# Patient Record
Sex: FEMALE | Race: WHITE | Employment: UNEMPLOYED | URBAN - METROPOLITAN AREA
[De-identification: names, ages, dates, MRNs, and addresses within clinical notes are randomized per-mention and may not be internally consistent; named-entity substitution may affect disease eponyms.]

---

## 2017-03-27 PROBLEM — R74.01 ELEVATED TRANSAMINASE LEVEL: Status: ACTIVE | Noted: 2017-03-27

## 2017-03-27 PROBLEM — K52.9 COLITIS: Status: ACTIVE | Noted: 2017-03-27

## 2017-03-27 PROBLEM — Z98.890 HISTORY OF TOE SURGERY: Status: ACTIVE | Noted: 2017-03-27

## 2018-08-19 ENCOUNTER — HOSPITAL ENCOUNTER (EMERGENCY)
Age: 53
Discharge: HOME | End: 2018-08-19
Payer: COMMERCIAL

## 2018-08-19 ENCOUNTER — APPOINTMENT (OUTPATIENT)
Dept: GENERAL RADIOLOGY | Age: 53
End: 2018-08-19
Payer: COMMERCIAL

## 2018-08-19 VITALS
SYSTOLIC BLOOD PRESSURE: 128 MMHG | DIASTOLIC BLOOD PRESSURE: 76 MMHG | OXYGEN SATURATION: 97 % | BODY MASS INDEX: 23.54 KG/M2 | HEART RATE: 68 BPM | TEMPERATURE: 97.8 F | WEIGHT: 150 LBS | HEIGHT: 67 IN | RESPIRATION RATE: 16 BRPM

## 2018-08-19 DIAGNOSIS — S92.514A CLOSED NONDISPLACED FRACTURE OF PROXIMAL PHALANX OF LESSER TOE OF RIGHT FOOT, INITIAL ENCOUNTER: Primary | ICD-10-CM

## 2018-08-19 PROCEDURE — 73630 X-RAY EXAM OF FOOT: CPT

## 2018-08-19 PROCEDURE — 99283 EMERGENCY DEPT VISIT LOW MDM: CPT

## 2018-08-19 RX ORDER — NAPROXEN 500 MG/1
500 TABLET ORAL 2 TIMES DAILY
Qty: 14 TABLET | Refills: 0 | Status: SHIPPED | OUTPATIENT
Start: 2018-08-19 | End: 2019-05-10 | Stop reason: ALTCHOICE

## 2018-08-19 ASSESSMENT — PAIN SCALES - GENERAL: PAINLEVEL_OUTOF10: 7

## 2018-08-19 ASSESSMENT — PAIN DESCRIPTION - PAIN TYPE: TYPE: ACUTE PAIN

## 2018-08-19 ASSESSMENT — PAIN DESCRIPTION - DESCRIPTORS: DESCRIPTORS: ACHING

## 2018-08-19 ASSESSMENT — PAIN DESCRIPTION - LOCATION: LOCATION: TOE (COMMENT WHICH ONE)

## 2018-08-19 ASSESSMENT — PAIN DESCRIPTION - ORIENTATION: ORIENTATION: RIGHT

## 2018-08-19 NOTE — ED PROVIDER NOTES
[facility]       Department of Emergency Medicine   ED  Provider Note  Admit Date/RoomTime: 2018  2:13 PM  ED Room: /  Chief Complaint   Foot Pain (stubbed 4th toe per patient cannot bear weight at all)    History of Present Illness   Source of history provided by:  patient. History/Exam Limitations: none. Montez Layton is a 45 y.o. old female presenting to the emergency department by private vehicle, for right, 4th toe pain which occured 1 day(s) prior to arrival. Cause of complaint: the patient accidentally kicked an end table while at home. Previous injury: no.  Since onset the symptoms have been moderate in degree. Her pain is aggraveated by movement, use and palpation and relieved by nothing. No other injury    ROS    Pertinent positives and negatives are stated within HPI, all other systems reviewed and are negative. Past Surgical History:   Procedure Laterality Date     SECTION      CHOLECYSTECTOMY, LAPAROSCOPIC      CRANIOTOMY  2011    left frontal crani for resection of arachnoid cyst    CYST REMOVAL      ECHO COMPL W DOP COLOR FLOW  2011         ECHO COMPL W DOP COLOR FLOW  2011         FOOT SURGERY      multiple times    HEMORRHOID SURGERY     Social History:  reports that she quit smoking about 26 years ago. She started smoking about 40 years ago. She smoked 0.50 packs per day. She has never used smokeless tobacco. She reports that she does not drink alcohol or use drugs. Family History: family history includes Cancer in her father and son. Allergies: Patient has no known allergies. Physical Exam     Vitals:    18 1416   BP: 128/76   Pulse: 68   Resp: 16   Temp: 97.8 °F (36.6 °C)   SpO2: 97%           Oxygen Saturation Interpretation: Normal.    Constitutional:  Alert, development consistent with age. Neck:  Normal ROM. Supple. Toe(s):   right, 4th toe. Tenderness: moderate. Swelling: Moderate. Deformity: no.              ROM: diminished range with pain. Skin:  Ecchymosis noted. Neurovascular: Motor deficit: none. Sensory deficit: none. Pulse deficit: none. Capillary refill: normal.  Foot:             Tenderness:  mild. Swelling: None. Deformity: no.            ROM: full range of motion. Skin:  no erythema, rash or wounds noted. Lymphatics: No lymphangitis or adenopathy noted. Neurological:  Oriented. Motor functions intact. .    Lab / Imaging Results   (All laboratory and radiology results have been personally reviewed by myself)  Labs:  No results found for this visit on 08/19/18. Imaging: All Radiology results interpreted by Radiologist unless otherwise noted. XR FOOT RIGHT (MIN 3 VIEWS)   Final Result      1. Oblique fracture proximal phalanx of the fourth digit. 2. Age-indeterminate lucency lateral aspect of the proximal phalanx of   the second digit at the level of PIP joint. Please correlate with   point tenderness. ED Course / Medical Decision Making   Medications - No data to display       Consult(s):   None    Procedure(s):   none    MDM:    Films were obtained and are positive for fracture. Plan is subsequently for symptom control, limited weight-bearing and  immobilization with appropriate outpatient follow-up. Counseling: The emergency provider has spoken with the patient and discussed todays results, in addition to providing specific details for the plan of care and counseling regarding the diagnosis and prognosis. Questions are answered at this time and they are agreeable with the plan. Assessment      1.  Closed nondisplaced fracture of proximal phalanx of lesser toe of right foot, initial encounter      Plan   Discharge to home  Patient condition is good    New Medications     New Prescriptions    NAPROXEN (NAPROSYN) 500 MG TABLET    Take 1 tablet by mouth 2 times

## 2019-02-05 LAB
CHOLESTEROL, TOTAL: 208 MG/DL
CHOLESTEROL/HDL RATIO: 3
HDLC SERPL-MCNC: 70 MG/DL (ref 35–70)
LDL CHOLESTEROL CALCULATED: 114 MG/DL (ref 0–160)
TRIGL SERPL-MCNC: 127 MG/DL
VLDLC SERPL CALC-MCNC: NORMAL MG/DL

## 2019-04-06 VITALS
HEIGHT: 67 IN | DIASTOLIC BLOOD PRESSURE: 70 MMHG | SYSTOLIC BLOOD PRESSURE: 120 MMHG | BODY MASS INDEX: 24.48 KG/M2 | TEMPERATURE: 98.2 F | WEIGHT: 156 LBS | HEART RATE: 94 BPM

## 2019-05-10 ENCOUNTER — OFFICE VISIT (OUTPATIENT)
Dept: PRIMARY CARE CLINIC | Age: 54
End: 2019-05-10
Payer: COMMERCIAL

## 2019-05-10 VITALS
TEMPERATURE: 97.5 F | WEIGHT: 156 LBS | OXYGEN SATURATION: 99 % | HEIGHT: 67 IN | BODY MASS INDEX: 24.48 KG/M2 | HEART RATE: 61 BPM | DIASTOLIC BLOOD PRESSURE: 82 MMHG | SYSTOLIC BLOOD PRESSURE: 124 MMHG

## 2019-05-10 DIAGNOSIS — D70.9 NEUTROPENIA, UNSPECIFIED TYPE (HCC): ICD-10-CM

## 2019-05-10 DIAGNOSIS — G93.0 INTRACRANIAL ARACHNOID CYST: ICD-10-CM

## 2019-05-10 DIAGNOSIS — F34.1 DYSTHYMIA: Primary | ICD-10-CM

## 2019-05-10 PROCEDURE — 99214 OFFICE O/P EST MOD 30 MIN: CPT | Performed by: FAMILY MEDICINE

## 2019-05-10 RX ORDER — DULOXETIN HYDROCHLORIDE 30 MG/1
30 CAPSULE, DELAYED RELEASE ORAL DAILY
Qty: 30 CAPSULE | Refills: 3 | Status: SHIPPED | OUTPATIENT
Start: 2019-05-10

## 2019-05-10 ASSESSMENT — PATIENT HEALTH QUESTIONNAIRE - PHQ9
SUM OF ALL RESPONSES TO PHQ9 QUESTIONS 1 & 2: 4
6. FEELING BAD ABOUT YOURSELF - OR THAT YOU ARE A FAILURE OR HAVE LET YOURSELF OR YOUR FAMILY DOWN: 0
7. TROUBLE CONCENTRATING ON THINGS, SUCH AS READING THE NEWSPAPER OR WATCHING TELEVISION: 0
SUM OF ALL RESPONSES TO PHQ QUESTIONS 1-9: 8
4. FEELING TIRED OR HAVING LITTLE ENERGY: 2
8. MOVING OR SPEAKING SO SLOWLY THAT OTHER PEOPLE COULD HAVE NOTICED. OR THE OPPOSITE, BEING SO FIGETY OR RESTLESS THAT YOU HAVE BEEN MOVING AROUND A LOT MORE THAN USUAL: 0
3. TROUBLE FALLING OR STAYING ASLEEP: 0
9. THOUGHTS THAT YOU WOULD BE BETTER OFF DEAD, OR OF HURTING YOURSELF: 0
1. LITTLE INTEREST OR PLEASURE IN DOING THINGS: 2
10. IF YOU CHECKED OFF ANY PROBLEMS, HOW DIFFICULT HAVE THESE PROBLEMS MADE IT FOR YOU TO DO YOUR WORK, TAKE CARE OF THINGS AT HOME, OR GET ALONG WITH OTHER PEOPLE: 1
5. POOR APPETITE OR OVEREATING: 2
2. FEELING DOWN, DEPRESSED OR HOPELESS: 2
SUM OF ALL RESPONSES TO PHQ QUESTIONS 1-9: 8

## 2019-05-10 ASSESSMENT — ENCOUNTER SYMPTOMS
GASTROINTESTINAL NEGATIVE: 1
ALLERGIC/IMMUNOLOGIC NEGATIVE: 1
RESPIRATORY NEGATIVE: 1
EYES NEGATIVE: 1

## 2019-05-10 NOTE — PROGRESS NOTES
5/10/19     Anupam Barfield    : 1965 Sex: female   Age: 52 y.o. Chief Complaint   Patient presents with    Discuss Labs    Depression     Would like to start re-starting medication. Prior to Admission medications    Medication Sig Start Date End Date Taking? Authorizing Provider   DULoxetine (CYMBALTA) 30 MG extended release capsule Take 1 capsule by mouth daily 5/10/19  Yes Barbara Gilmore,    DULoxetine (CYMBALTA) 30 MG capsule Take 30 mg by mouth every evening     Historical Provider, MD        HPI: Aundrea Curry presents today in medical follow-up issues of mild depression dysthymia. Previously on Cymbalta did well. Recurring symptoms. Initiated Cymbalta 30 mg daily today. History of intracranial arachnoid cyst has been stable. Recent labs mild neutropenia but stable at 3.6        ROS:  Review of Systems   Constitutional: Negative. HENT: Negative. Eyes: Negative. Respiratory: Negative. Gastrointestinal: Negative. Endocrine: Negative. Genitourinary: Negative. Musculoskeletal: Negative. Skin: Negative. Allergic/Immunologic: Negative. Neurological: Negative. Hematological: Negative. Psychiatric/Behavioral: Negative. Mild recurrent symptoms of depression.  Resume Cymbalta          Current Outpatient Medications:     DULoxetine (CYMBALTA) 30 MG extended release capsule, Take 1 capsule by mouth daily, Disp: 30 capsule, Rfl: 3    DULoxetine (CYMBALTA) 30 MG capsule, Take 30 mg by mouth every evening , Disp: , Rfl:     No Known Allergies    Social History     Tobacco Use    Smoking status: Former Smoker     Packs/day: 0.50     Years: 8.00     Pack years: 4.00     Start date: 3/27/1978     Last attempt to quit: 1991     Years since quittin.6    Smokeless tobacco: Never Used   Substance Use Topics    Alcohol use: No    Drug use: No      Past Surgical History:   Procedure Laterality Date     SECTION      CHOLECYSTECTOMY, LAPAROSCOPIC      CRANIOTOMY  9/19/2011    left frontal crani for resection of arachnoid cyst    CYST REMOVAL      ECHO COMPL W DOP COLOR FLOW  9/23/2011         ECHO COMPL W DOP COLOR FLOW  9/25/2011         FOOT SURGERY      multiple times    HEMORRHOID SURGERY       Family History   Problem Relation Age of Onset    Cancer Father     Cancer Son     Cancer Mother     Diabetes Mother      Past Medical History:   Diagnosis Date    Arachnoid cyst 9/20/2011    Brain mass 9/17/2011    Depression     H/o SVT (supraventricular tachycardia) (HCC)     Hypothyroidism     Osteoarthritis        Vitals:    05/10/19 0953   BP: 124/82   Pulse: 61   Temp: 97.5 °F (36.4 °C)   TempSrc: Tympanic   SpO2: 99%   Weight: 156 lb (70.8 kg)   Height: 5' 7\" (1.702 m)       Physical Exam   Constitutional: She is oriented to person, place, and time. She appears well-developed and well-nourished. HENT:   Head: Normocephalic. Right Ear: External ear normal.   Left Ear: External ear normal.   Nose: Nose normal.   Mouth/Throat: Oropharynx is clear and moist.   Eyes: Pupils are equal, round, and reactive to light. Conjunctivae and EOM are normal.   Neck: Normal range of motion. Neck supple. Cardiovascular: Normal rate and intact distal pulses. Pulmonary/Chest: Breath sounds normal.   Abdominal: Soft. Bowel sounds are normal.   Musculoskeletal: Normal range of motion. Neurological: She is alert and oriented to person, place, and time. Skin: Skin is warm and dry. Psychiatric: She has a normal mood and affect. Her behavior is normal.   Nursing note and vitals reviewed. As noted patient doing well and didn't resume Cymbalta. Labs reviewed with her today stable white count was 3.6 with neutrophils at 1430 stable cholesterol 218 HDL 61 and  chemistry is all stable TSH 0.79 T4 1 0.0 and stable A1c at 5.1     repeat labs on follow-up.  She will see me in 3 months         Plan Per Assessment:  Seth Hay was seen today for discuss labs and depression. Diagnoses and all orders for this visit:    Dysthymia  -     CBC Auto Differential; Future  -     Comprehensive Metabolic Panel; Future  -     TSH without Reflex; Future    Intracranial arachnoid cyst    Neutropenia, unspecified type (HCC)  -     CBC Auto Differential; Future  -     Comprehensive Metabolic Panel; Future  -     TSH without Reflex; Future    Other orders  -     DULoxetine (CYMBALTA) 30 MG extended release capsule; Take 1 capsule by mouth daily        Return in about 3 months (around 8/10/2019). Teofilo Rooney DO    Note was generated with the assistance of voice recognition software. Document was reviewed however may contain grammatical errors.

## 2019-05-10 NOTE — PROGRESS NOTES
5/10/19     Abdiel Stark    : 1965 Sex: female   Age: 52 y.o. Chief Complaint   Patient presents with    Discuss Labs    Depression     Would like to start re-starting medication. Prior to Admission medications    Medication Sig Start Date End Date Taking?  Authorizing Provider   DULoxetine (CYMBALTA) 30 MG capsule Take 30 mg by mouth every evening     Historical Provider, MD        HPI:         ROS:  Review of Systems       Current Outpatient Medications:     DULoxetine (CYMBALTA) 30 MG capsule, Take 30 mg by mouth every evening , Disp: , Rfl:     No Known Allergies    Social History     Tobacco Use    Smoking status: Former Smoker     Packs/day: 0.50     Years: 8.00     Pack years: 4.00     Start date: 3/27/1978     Last attempt to quit: 1991     Years since quittin.6    Smokeless tobacco: Never Used   Substance Use Topics    Alcohol use: No    Drug use: No      Past Surgical History:   Procedure Laterality Date     SECTION      CHOLECYSTECTOMY, LAPAROSCOPIC      CRANIOTOMY  2011    left frontal crani for resection of arachnoid cyst    CYST REMOVAL      ECHO COMPL W DOP COLOR FLOW  2011         ECHO COMPL W DOP COLOR FLOW  2011         FOOT SURGERY      multiple times    HEMORRHOID SURGERY       Family History   Problem Relation Age of Onset    Cancer Father     Cancer Son     Cancer Mother     Diabetes Mother      Past Medical History:   Diagnosis Date    Arachnoid cyst 2011    Brain mass 2011    Depression     H/o SVT (supraventricular tachycardia) (HCC)     Hypothyroidism     Osteoarthritis        Vitals:    05/10/19 0953   BP: 124/82   Pulse: 61   Temp: 97.5 °F (36.4 °C)   TempSrc: Tympanic   SpO2: 99%   Weight: 156 lb (70.8 kg)   Height: 5' 7\" (1.702 m)       Physical Exam     Lab Results   Component Value Date    WBC 2.7 (L) 2018    HGB 13.5 2018    HCT 38.7 2018     2018    CHOL 208

## 2020-10-18 ENCOUNTER — HOSPITAL ENCOUNTER (EMERGENCY)
Age: 55
Discharge: HOME | End: 2020-10-18
Attending: EMERGENCY MEDICINE
Payer: COMMERCIAL

## 2020-10-18 ENCOUNTER — APPOINTMENT (OUTPATIENT)
Dept: GENERAL RADIOLOGY | Age: 55
End: 2020-10-18
Payer: COMMERCIAL

## 2020-10-18 VITALS
WEIGHT: 152 LBS | SYSTOLIC BLOOD PRESSURE: 151 MMHG | RESPIRATION RATE: 16 BRPM | BODY MASS INDEX: 23.86 KG/M2 | TEMPERATURE: 98.1 F | OXYGEN SATURATION: 100 % | HEART RATE: 81 BPM | HEIGHT: 67 IN | DIASTOLIC BLOOD PRESSURE: 86 MMHG

## 2020-10-18 PROCEDURE — 12011 RPR F/E/E/N/L/M 2.5 CM/<: CPT

## 2020-10-18 PROCEDURE — 73130 X-RAY EXAM OF HAND: CPT

## 2020-10-18 PROCEDURE — 2500000003 HC RX 250 WO HCPCS: Performed by: PHYSICIAN ASSISTANT

## 2020-10-18 PROCEDURE — 6360000002 HC RX W HCPCS: Performed by: PHYSICIAN ASSISTANT

## 2020-10-18 PROCEDURE — 99283 EMERGENCY DEPT VISIT LOW MDM: CPT

## 2020-10-18 PROCEDURE — 90471 IMMUNIZATION ADMIN: CPT | Performed by: PHYSICIAN ASSISTANT

## 2020-10-18 PROCEDURE — 99284 EMERGENCY DEPT VISIT MOD MDM: CPT

## 2020-10-18 PROCEDURE — 12001 RPR S/N/AX/GEN/TRNK 2.5CM/<: CPT

## 2020-10-18 PROCEDURE — 90715 TDAP VACCINE 7 YRS/> IM: CPT | Performed by: PHYSICIAN ASSISTANT

## 2020-10-18 PROCEDURE — 6370000000 HC RX 637 (ALT 250 FOR IP): Performed by: PHYSICIAN ASSISTANT

## 2020-10-18 RX ORDER — CEPHALEXIN 500 MG/1
500 CAPSULE ORAL 4 TIMES DAILY
Qty: 40 CAPSULE | Refills: 0 | Status: SHIPPED | OUTPATIENT
Start: 2020-10-18 | End: 2020-10-28

## 2020-10-18 RX ORDER — OXYCODONE HYDROCHLORIDE AND ACETAMINOPHEN 5; 325 MG/1; MG/1
1 TABLET ORAL EVERY 6 HOURS PRN
Qty: 12 TABLET | Refills: 0 | Status: SHIPPED | OUTPATIENT
Start: 2020-10-18 | End: 2020-10-21

## 2020-10-18 RX ORDER — LIDOCAINE HYDROCHLORIDE 10 MG/ML
20 INJECTION, SOLUTION INFILTRATION; PERINEURAL ONCE
Status: COMPLETED | OUTPATIENT
Start: 2020-10-18 | End: 2020-10-18

## 2020-10-18 RX ORDER — CEPHALEXIN 500 MG/1
500 CAPSULE ORAL ONCE
Status: COMPLETED | OUTPATIENT
Start: 2020-10-18 | End: 2020-10-18

## 2020-10-18 RX ORDER — OXYCODONE HYDROCHLORIDE AND ACETAMINOPHEN 5; 325 MG/1; MG/1
1 TABLET ORAL ONCE
Status: COMPLETED | OUTPATIENT
Start: 2020-10-18 | End: 2020-10-18

## 2020-10-18 RX ADMIN — LIDOCAINE HYDROCHLORIDE 20 ML: 10 INJECTION, SOLUTION INFILTRATION; PERINEURAL at 12:00

## 2020-10-18 RX ADMIN — CEPHALEXIN 500 MG: 500 CAPSULE ORAL at 14:29

## 2020-10-18 RX ADMIN — OXYCODONE HYDROCHLORIDE AND ACETAMINOPHEN 1 TABLET: 5; 325 TABLET ORAL at 11:59

## 2020-10-18 RX ADMIN — TETANUS TOXOID, REDUCED DIPHTHERIA TOXOID AND ACELLULAR PERTUSSIS VACCINE, ADSORBED 0.5 ML: 5; 2.5; 8; 8; 2.5 SUSPENSION INTRAMUSCULAR at 12:00

## 2020-10-18 ASSESSMENT — PAIN SCALES - GENERAL
PAINLEVEL_OUTOF10: 10
PAINLEVEL_OUTOF10: 10

## 2020-10-18 ASSESSMENT — PAIN DESCRIPTION - ORIENTATION: ORIENTATION: LEFT

## 2020-10-18 ASSESSMENT — PAIN DESCRIPTION - LOCATION: LOCATION: HAND

## 2020-10-18 ASSESSMENT — PAIN DESCRIPTION - PAIN TYPE: TYPE: ACUTE PAIN

## 2020-10-18 ASSESSMENT — PAIN DESCRIPTION - DESCRIPTORS: DESCRIPTORS: CONSTANT;DISCOMFORT;SHARP

## 2020-10-18 NOTE — ED PROVIDER NOTES
HPI:  10/18/20,   Time: 12:22 PM EDT         Cristina Payne is a 62 y.o. female presenting to the ED for left thumb pain, beginning prior to arrival.  The complaint has been persistent, moderate in severity, and worsened by movement of thumb or palpation of thumb. Patient states that she was shooting across bone strength went backwards hitting her left thumb. She is right-hand dominant. She has a laceration to the left thumb. Her tetanus shot is not up-to-date. Pain is 10 out of 10. ROS:   Pertinent positives and negatives are stated within HPI, all other systems reviewed and are negative.  --------------------------------------------- PAST HISTORY ---------------------------------------------  Past Medical History:  has a past medical history of Arachnoid cyst, Brain mass, Depression, H/o SVT (supraventricular tachycardia) ([facility] [address] Hypothyroidism, and Osteoarthritis. Past Surgical History:  has a past surgical history that includes Cholecystectomy, laparoscopic; cyst removal; craniotomy (2011); ECHO Compl W Dop Color Flow (2011); ECHO Compl W Dop Color Flow (2011); Hemorrhoid surgery;  section; and Foot surgery. Social History:  reports that she quit smoking about 29 years ago. She started smoking about 42 years ago. She has a 4.00 pack-year smoking history. She has never used smokeless tobacco. She reports that she does not drink alcohol or use drugs. Family History: family history includes Cancer in her father, mother, and son; Diabetes in her mother. The patients home medications have been reviewed. Allergies: Patient has no known allergies. -------------------------------------------------- RESULTS -------------------------------------------------  All laboratory and radiology results have been personally reviewed by myself   LABS:  No results found for this visit on 10/18/20.     RADIOLOGY:  Interpreted by Radiologist.  XR HAND LEFT (MIN 3 VIEWS)   Final Result Mildly displaced fracture of the left 1st distal phalanx with some hyper   extension at the metacarpal phalangeal joint.             ------------------------- NURSING NOTES AND VITALS REVIEWED ---------------------------   The nursing notes within the ED encounter and vital signs as below have been reviewed. BP (!) 151/86   Pulse 81   Temp 98.1 °F (36.7 °C) (Oral)   Resp 16   Ht 5' 7\" (1.702 m)   Wt 152 lb (68.9 kg)   LMP 08/27/2011   SpO2 100%   BMI 23.81 kg/m²   Oxygen Saturation Interpretation: Normal      ---------------------------------------------------PHYSICAL EXAM--------------------------------------      Constitutional/General: Alert and oriented x3, well appearing, non toxic in NAD  Head: NC/AT  Eyes: PERRL, EOMI  Mouth: Oropharynx clear, handling secretions, no trismus  Neck: Supple, full ROM, no meningeal signs  Pulmonary: Lungs clear to auscultation bilaterally, no wheezes, rales, or rhonchi. Not in respiratory distress  Cardiovascular:  Regular rate and rhythm, no murmurs, gallops, or rubs. 2+ distal pulses  Abdomen: Soft, non tender, non distended,   Extremities: Moves all extremities x 4. Warm and well perfused. Pain to palpation of the left distal 1st digit. There is a 2 cm laceration to the left medial thumb along nail with some partial avulsion of the base of the nail. Patient has some edema noted to distal first digit. She has some flexion but it is limited. Skin: warm and dry without rash. 2 cm laceration to the distal left thumb that is gaping and bleeding. Neurologic: GCS 15,  Psych: Normal Affect      LACERATION REPAIR  PROCEDURE NOTE:  Unless otherwise indicated, this procedure was done or directly supervised by the ED attending. Laceration #: 1. Location: left distal, medial thumb  Length: 2 cm. The wound area was cleansend with shur-clens and draped in a sterile fashion. The wound area was anesthetized with Lidocaine 1% without epinephrine.   WOUND COMPLEXITY:    Debridement: None. Undermining: None. Wound Margins Revised: None. Flaps Aligned: yes. The wound was explored with the following results no foreign body or tendon injury seen. The wound was closed with 5-0 Ethilon using interrupted sutures. Dressing:  bacitracin and a bandage was placed. Total number suture: 3.0 vycril was used to tac down nail. Cuticle of the nail was disrupted. 1 Vicryl suture was used 3 Ethilon sutures were used. ------------------------------ ED COURSE/MEDICAL DECISION MAKING----------------------  Medications   cephALEXin (KEFLEX) capsule 500 mg (has no administration in time range)   lidocaine 1 % injection 20 mL (20 mLs Intradermal Given 10/18/20 1200)   Tetanus-Diphth-Acell Pertussis (BOOSTRIX) injection 0.5 mL (0.5 mLs Intramuscular Given 10/18/20 1200)   oxyCODONE-acetaminophen (PERCOCET) 5-325 MG per tablet 1 tablet (1 tablet Oral Given 10/18/20 1159)         Medical Decision Making:    Patient was evaluated by Dr. Rose Marie Garcia and treatment plan was discussed. Patient is instructed to follow up with orthopedic surgeon. We will place splint on thumb and give patient pain medication and antibiotics. Patient is to return for any worsening symptoms. Counseling: The emergency provider has spoken with the patient and discussed todays results, in addition to providing specific details for the plan of care and counseling regarding the diagnosis and prognosis. Questions are answered at this time and they are agreeable with the plan.      --------------------------------- IMPRESSION AND DISPOSITION ---------------------------------    IMPRESSION  1. Laceration of left thumb without foreign body with damage to nail, initial encounter New Problem   2. Open displaced fracture of distal phalanx of left thumb, initial encounter New Problem   3.  Need for Tdap vaccination New Problem       DISPOSITION  Disposition: Discharge to home  Patient condition is stable                 [address] [place] [address],  Cleveland Lira  10/18/20 2307

## 2023-10-23 ENCOUNTER — OFFICE VISIT (OUTPATIENT)
Dept: PODIATRY | Age: 58
End: 2023-10-23
Payer: COMMERCIAL

## 2023-10-23 ENCOUNTER — TELEPHONE (OUTPATIENT)
Dept: PODIATRY | Age: 58
End: 2023-10-23

## 2023-10-23 VITALS — TEMPERATURE: 97.4 F | BODY MASS INDEX: 20.36 KG/M2 | WEIGHT: 130 LBS

## 2023-10-23 DIAGNOSIS — M20.11 HALLUX VALGUS OF RIGHT FOOT: ICD-10-CM

## 2023-10-23 DIAGNOSIS — T84.84XA PAINFUL ORTHOPAEDIC HARDWARE (HCC): ICD-10-CM

## 2023-10-23 DIAGNOSIS — M20.11 ACQUIRED HALLUX VALGUS OF RIGHT FOOT: ICD-10-CM

## 2023-10-23 DIAGNOSIS — Z98.890 HISTORY OF TOE SURGERY: ICD-10-CM

## 2023-10-23 DIAGNOSIS — M79.671 PAIN IN RIGHT FOOT: Primary | ICD-10-CM

## 2023-10-23 PROCEDURE — 99213 OFFICE O/P EST LOW 20 MIN: CPT | Performed by: PODIATRIST

## 2023-10-23 RX ORDER — DULOXETIN HYDROCHLORIDE 60 MG/1
60 CAPSULE, DELAYED RELEASE ORAL DAILY
COMMUNITY
Start: 2023-10-11

## 2023-10-23 NOTE — PATIENT INSTRUCTIONS
Surgery will be scheduled for 11/17/2023 at [facility] in [address] [facility]   They will contact you to give you all your preop instructions  Do not eat or drink anything after midnight on 11/16/2023 nothing morning of surgery  Call Cele Soria if any issues [phone]  Call Dr. Orlando Sheridan and set up a preop clearance history and physical appointment asap  No special paperwork needs filled out she needs to see you and clear you in her office   Note  Will need that office note, ekg and any labs all faxed to Celeadrian Soria at [phone]

## 2023-10-23 NOTE — TELEPHONE ENCOUNTER
Prior Authorization Form:      DEMOGRAPHICS:                     Patient Name:  Sinai Manzo  Patient :  1965            Insurance:  Payor: [place] / Plan: [place] / Product Type: *No Product type* /   Insurance ID Number:    Payer/Plan Subscr  Sex Relation Sub. Ins. ID Effective Group Num   1.  [address] 1969 Male Spouse [id number] 19 [id number]                                   [address]         DIAGNOSIS & PROCEDURE:                       Procedure/Operation: arthrex  general  2 hours  removal hardware right foot, akin ostetomy right foot with exostectomy second digit right foot            CPT Code: 25892 09604    Diagnosis:  m20.11 t84.84xa    ICD10 Code: m20.11 t84.84xa    Location:  right foot    Surgeon:  larry    SCHEDULING INFORMATION:                          Date: 2023   Time:              Anesthesia:  general                                                       Status:  Outpatient        Special Comments:       Electronically signed by Robert Larson LPN on  at 10:32 AM

## 2023-11-15 NOTE — PROGRESS NOTES
[address] [employer] PRE-ADMISSION TESTING INSTRUCTIONS    The Preadmission Testing patient is instructed accordingly using the following criteria (check applicable):    ARRIVAL INSTRUCTIONS:  [x] Parking the day of Surgery is located in the Main Entrance lot. Upon entering the door, make an immediate right to the surgery reception desk    [x] Bring photo ID and insurance card    [] Bring in a copy of Living will or Durable Power of  papers. [x] Please be sure to arrange for responsible adult to provide transportation to and from the hospital    [x] Please arrange for responsible adult to be with you for the 24 hour period post procedure due to having anesthesia    [x] If you awake am of surgery not feeling well or have temperature >100 please call [phone]    GENERAL INSTRUCTIONS:    [x] Nothing by mouth after midnight, including gum, candy, mints or water    [x] You may brush your teeth, but do not swallow any water    [x] Take medications as instructed with 1-2 oz of water    [] Stop herbal supplements and vitamins 5 days prior to procedure    [] Follow preop dosing of blood thinners per physician instructions    [] Take 1/2 dose of evening insulin, but no insulin after midnight    [] No oral diabetic medications after midnight    [] If diabetic and have low blood sugar or feel symptomatic, take 1-2oz apple juice only    [] Bring inhalers day of surgery    [] Bring C-PAP/ Bi-Pap day of surgery    [] Bring urine specimen day of surgery    [x] Shower or bath with soap, lather and rinse well, AM of Surgery, no lotion, powders or creams to surgical site    [] Follow bowel prep as instructed per surgeon    [x] No tobacco products within 24 hours of surgery     [x] No alcohol or illegal drug use within 24 hours of surgery.     [x] Jewelry, body piercing's, eyeglasses, contact lenses and dentures are not permitted into surgery (bring cases)      [x] Please do not wear any nail polish,

## 2023-11-16 ENCOUNTER — ANESTHESIA EVENT (OUTPATIENT)
Dept: OPERATING ROOM | Age: 58
End: 2023-11-16
Payer: COMMERCIAL

## 2023-11-17 ENCOUNTER — ANESTHESIA (OUTPATIENT)
Dept: OPERATING ROOM | Age: 58
End: 2023-11-17
Payer: COMMERCIAL

## 2023-11-17 ENCOUNTER — APPOINTMENT (OUTPATIENT)
Dept: GENERAL RADIOLOGY | Age: 58
End: 2023-11-17
Attending: PODIATRIST
Payer: COMMERCIAL

## 2023-11-17 ENCOUNTER — PREP FOR PROCEDURE (OUTPATIENT)
Dept: PODIATRY | Age: 58
End: 2023-11-17

## 2023-11-17 ENCOUNTER — HOSPITAL ENCOUNTER (OUTPATIENT)
Age: 58
Setting detail: OUTPATIENT SURGERY
Discharge: HOME | End: 2023-11-17
Attending: PODIATRIST | Admitting: PODIATRIST
Payer: COMMERCIAL

## 2023-11-17 VITALS
BODY MASS INDEX: 21.19 KG/M2 | DIASTOLIC BLOOD PRESSURE: 71 MMHG | SYSTOLIC BLOOD PRESSURE: 133 MMHG | HEART RATE: 70 BPM | TEMPERATURE: 97.4 F | WEIGHT: 135 LBS | HEIGHT: 67 IN | RESPIRATION RATE: 20 BRPM | OXYGEN SATURATION: 99 %

## 2023-11-17 DIAGNOSIS — G89.18 POST-OPERATIVE PAIN: ICD-10-CM

## 2023-11-17 DIAGNOSIS — T84.84XA PAINFUL ORTHOPAEDIC HARDWARE (HCC): Primary | ICD-10-CM

## 2023-11-17 DIAGNOSIS — T84.84XA ARTIFICIAL JOINT PAIN (HCC): ICD-10-CM

## 2023-11-17 DIAGNOSIS — M20.11 ACQUIRED HALLUX VALGUS OF RIGHT FOOT: ICD-10-CM

## 2023-11-17 PROCEDURE — 20680 REMOVAL OF IMPLANT DEEP: CPT | Performed by: PODIATRIST

## 2023-11-17 PROCEDURE — 3600000002 HC SURGERY LEVEL 2 BASE: Performed by: PODIATRIST

## 2023-11-17 PROCEDURE — 28124 PARTIAL REMOVAL OF TOE: CPT | Performed by: PODIATRIST

## 2023-11-17 PROCEDURE — 6370000000 HC RX 637 (ALT 250 FOR IP)

## 2023-11-17 PROCEDURE — 88304 TISSUE EXAM BY PATHOLOGIST: CPT

## 2023-11-17 PROCEDURE — 88311 DECALCIFY TISSUE: CPT

## 2023-11-17 PROCEDURE — 2720000010 HC SURG SUPPLY STERILE: Performed by: PODIATRIST

## 2023-11-17 PROCEDURE — 3600000012 HC SURGERY LEVEL 2 ADDTL 15MIN: Performed by: PODIATRIST

## 2023-11-17 PROCEDURE — 2709999900 HC NON-CHARGEABLE SUPPLY: Performed by: PODIATRIST

## 2023-11-17 PROCEDURE — 7100000000 HC PACU RECOVERY - FIRST 15 MIN: Performed by: PODIATRIST

## 2023-11-17 PROCEDURE — 7100000011 HC PHASE II RECOVERY - ADDTL 15 MIN: Performed by: PODIATRIST

## 2023-11-17 PROCEDURE — 2580000003 HC RX 258

## 2023-11-17 PROCEDURE — 6360000002 HC RX W HCPCS: Performed by: ANESTHESIOLOGY

## 2023-11-17 PROCEDURE — C1713 ANCHOR/SCREW BN/BN,TIS/BN: HCPCS | Performed by: PODIATRIST

## 2023-11-17 PROCEDURE — 3700000000 HC ANESTHESIA ATTENDED CARE: Performed by: PODIATRIST

## 2023-11-17 PROCEDURE — 2500000003 HC RX 250 WO HCPCS

## 2023-11-17 PROCEDURE — 27685 REVISION OF LOWER LEG TENDON: CPT | Performed by: PODIATRIST

## 2023-11-17 PROCEDURE — 6360000002 HC RX W HCPCS: Performed by: PODIATRIST

## 2023-11-17 PROCEDURE — 7100000010 HC PHASE II RECOVERY - FIRST 15 MIN: Performed by: PODIATRIST

## 2023-11-17 PROCEDURE — 28298 COR HLX VLGS PRX PHLX OSTEOT: CPT | Performed by: PODIATRIST

## 2023-11-17 PROCEDURE — 88300 SURGICAL PATH GROSS: CPT

## 2023-11-17 PROCEDURE — 7100000001 HC PACU RECOVERY - ADDTL 15 MIN: Performed by: PODIATRIST

## 2023-11-17 PROCEDURE — 3700000001 HC ADD 15 MINUTES (ANESTHESIA): Performed by: PODIATRIST

## 2023-11-17 PROCEDURE — 6360000002 HC RX W HCPCS

## 2023-11-17 DEVICE — DYNANITE NITI STAPLE W/INSTRS 11X10L
Type: IMPLANTABLE DEVICE | Site: FOOT | Status: FUNCTIONAL
Brand: ARTHREX®

## 2023-11-17 RX ORDER — SODIUM CHLORIDE 0.9 % (FLUSH) 0.9 %
5-40 SYRINGE (ML) INJECTION PRN
Status: DISCONTINUED | OUTPATIENT
Start: 2023-11-17 | End: 2023-11-17 | Stop reason: HOSPADM

## 2023-11-17 RX ORDER — PROPOFOL 10 MG/ML
INJECTION, EMULSION INTRAVENOUS PRN
Status: DISCONTINUED | OUTPATIENT
Start: 2023-11-17 | End: 2023-11-17 | Stop reason: SDUPTHER

## 2023-11-17 RX ORDER — LIDOCAINE HYDROCHLORIDE 20 MG/ML
INJECTION, SOLUTION EPIDURAL; INFILTRATION; INTRACAUDAL; PERINEURAL PRN
Status: DISCONTINUED | OUTPATIENT
Start: 2023-11-17 | End: 2023-11-17 | Stop reason: SDUPTHER

## 2023-11-17 RX ORDER — BUPIVACAINE HYDROCHLORIDE 5 MG/ML
INJECTION, SOLUTION EPIDURAL; INTRACAUDAL PRN
Status: DISCONTINUED | OUTPATIENT
Start: 2023-11-17 | End: 2023-11-17 | Stop reason: ALTCHOICE

## 2023-11-17 RX ORDER — MIDAZOLAM HYDROCHLORIDE 2 MG/2ML
2 INJECTION, SOLUTION INTRAMUSCULAR; INTRAVENOUS
Status: COMPLETED | OUTPATIENT
Start: 2023-11-17 | End: 2023-11-17

## 2023-11-17 RX ORDER — MEPERIDINE HYDROCHLORIDE 25 MG/ML
12.5 INJECTION INTRAMUSCULAR; INTRAVENOUS; SUBCUTANEOUS EVERY 5 MIN PRN
Status: DISCONTINUED | OUTPATIENT
Start: 2023-11-17 | End: 2023-11-17 | Stop reason: HOSPADM

## 2023-11-17 RX ORDER — HYDROCODONE BITARTRATE AND ACETAMINOPHEN 5; 325 MG/1; MG/1
1 TABLET ORAL ONCE AS NEEDED
Status: DISCONTINUED | OUTPATIENT
Start: 2023-11-17 | End: 2023-11-17 | Stop reason: HOSPADM

## 2023-11-17 RX ORDER — SODIUM CHLORIDE 9 MG/ML
INJECTION, SOLUTION INTRAVENOUS PRN
Status: CANCELLED | OUTPATIENT
Start: 2023-11-17

## 2023-11-17 RX ORDER — IPRATROPIUM BROMIDE AND ALBUTEROL SULFATE 2.5; .5 MG/3ML; MG/3ML
1 SOLUTION RESPIRATORY (INHALATION)
Status: DISCONTINUED | OUTPATIENT
Start: 2023-11-17 | End: 2023-11-17 | Stop reason: HOSPADM

## 2023-11-17 RX ORDER — SODIUM CHLORIDE 0.9 % (FLUSH) 0.9 %
5-40 SYRINGE (ML) INJECTION PRN
Status: CANCELLED | OUTPATIENT
Start: 2023-11-17

## 2023-11-17 RX ORDER — HYDRALAZINE HYDROCHLORIDE 20 MG/ML
10 INJECTION INTRAMUSCULAR; INTRAVENOUS
Status: DISCONTINUED | OUTPATIENT
Start: 2023-11-17 | End: 2023-11-17 | Stop reason: HOSPADM

## 2023-11-17 RX ORDER — SODIUM CHLORIDE 0.9 % (FLUSH) 0.9 %
5-40 SYRINGE (ML) INJECTION EVERY 12 HOURS SCHEDULED
Status: CANCELLED | OUTPATIENT
Start: 2023-11-17

## 2023-11-17 RX ORDER — SODIUM CHLORIDE 9 MG/ML
INJECTION, SOLUTION INTRAVENOUS CONTINUOUS PRN
Status: DISCONTINUED | OUTPATIENT
Start: 2023-11-17 | End: 2023-11-17 | Stop reason: SDUPTHER

## 2023-11-17 RX ORDER — SODIUM CHLORIDE 0.9 % (FLUSH) 0.9 %
5-40 SYRINGE (ML) INJECTION EVERY 12 HOURS SCHEDULED
Status: DISCONTINUED | OUTPATIENT
Start: 2023-11-17 | End: 2023-11-17 | Stop reason: HOSPADM

## 2023-11-17 RX ORDER — DEXAMETHASONE SODIUM PHOSPHATE 4 MG/ML
INJECTION, SOLUTION INTRA-ARTICULAR; INTRALESIONAL; INTRAMUSCULAR; INTRAVENOUS; SOFT TISSUE PRN
Status: DISCONTINUED | OUTPATIENT
Start: 2023-11-17 | End: 2023-11-17 | Stop reason: SDUPTHER

## 2023-11-17 RX ORDER — HYDROCODONE BITARTRATE AND ACETAMINOPHEN 5; 325 MG/1; MG/1
1 TABLET ORAL EVERY 6 HOURS PRN
Qty: 20 TABLET | Refills: 0 | Status: SHIPPED | OUTPATIENT
Start: 2023-11-17 | End: 2023-11-24

## 2023-11-17 RX ORDER — PHENYLEPHRINE HCL IN 0.9% NACL 1 MG/10 ML
SYRINGE (ML) INTRAVENOUS PRN
Status: DISCONTINUED | OUTPATIENT
Start: 2023-11-17 | End: 2023-11-17 | Stop reason: SDUPTHER

## 2023-11-17 RX ORDER — FENTANYL CITRATE 50 UG/ML
INJECTION, SOLUTION INTRAMUSCULAR; INTRAVENOUS PRN
Status: DISCONTINUED | OUTPATIENT
Start: 2023-11-17 | End: 2023-11-17 | Stop reason: SDUPTHER

## 2023-11-17 RX ORDER — LABETALOL HYDROCHLORIDE 5 MG/ML
10 INJECTION, SOLUTION INTRAVENOUS
Status: DISCONTINUED | OUTPATIENT
Start: 2023-11-17 | End: 2023-11-17 | Stop reason: HOSPADM

## 2023-11-17 RX ORDER — HYDROCODONE BITARTRATE AND ACETAMINOPHEN 5; 325 MG/1; MG/1
TABLET ORAL
Status: COMPLETED
Start: 2023-11-17 | End: 2023-11-17

## 2023-11-17 RX ORDER — ROCURONIUM BROMIDE 10 MG/ML
INJECTION, SOLUTION INTRAVENOUS PRN
Status: DISCONTINUED | OUTPATIENT
Start: 2023-11-17 | End: 2023-11-17 | Stop reason: SDUPTHER

## 2023-11-17 RX ORDER — SODIUM CHLORIDE 9 MG/ML
INJECTION, SOLUTION INTRAVENOUS PRN
Status: DISCONTINUED | OUTPATIENT
Start: 2023-11-17 | End: 2023-11-17 | Stop reason: HOSPADM

## 2023-11-17 RX ORDER — ONDANSETRON 2 MG/ML
4 INJECTION INTRAMUSCULAR; INTRAVENOUS
Status: DISCONTINUED | OUTPATIENT
Start: 2023-11-17 | End: 2023-11-17 | Stop reason: HOSPADM

## 2023-11-17 RX ORDER — ONDANSETRON 2 MG/ML
INJECTION INTRAMUSCULAR; INTRAVENOUS PRN
Status: DISCONTINUED | OUTPATIENT
Start: 2023-11-17 | End: 2023-11-17 | Stop reason: SDUPTHER

## 2023-11-17 RX ADMIN — LIDOCAINE HYDROCHLORIDE 100 MG: 20 INJECTION, SOLUTION EPIDURAL; INFILTRATION; INTRACAUDAL; PERINEURAL at 07:00

## 2023-11-17 RX ADMIN — PROPOFOL 200 MG: 10 INJECTION, EMULSION INTRAVENOUS at 07:00

## 2023-11-17 RX ADMIN — MIDAZOLAM HYDROCHLORIDE 2 MG: 1 INJECTION, SOLUTION INTRAMUSCULAR; INTRAVENOUS at 06:52

## 2023-11-17 RX ADMIN — HYDROCODONE BITARTRATE AND ACETAMINOPHEN 1 TABLET: 5; 325 TABLET ORAL at 09:00

## 2023-11-17 RX ADMIN — HYDROMORPHONE HYDROCHLORIDE 0.5 MG: 1 INJECTION, SOLUTION INTRAMUSCULAR; INTRAVENOUS; SUBCUTANEOUS at 08:28

## 2023-11-17 RX ADMIN — MEPERIDINE HYDROCHLORIDE 12.5 MG: 25 INJECTION, SOLUTION INTRAMUSCULAR; INTRAVENOUS; SUBCUTANEOUS at 08:37

## 2023-11-17 RX ADMIN — ONDANSETRON 4 MG: 2 INJECTION INTRAMUSCULAR; INTRAVENOUS at 08:05

## 2023-11-17 RX ADMIN — HYDROMORPHONE HYDROCHLORIDE 0.5 MG: 1 INJECTION, SOLUTION INTRAMUSCULAR; INTRAVENOUS; SUBCUTANEOUS at 08:21

## 2023-11-17 RX ADMIN — Medication 100 MCG: at 07:11

## 2023-11-17 RX ADMIN — DEXAMETHASONE SODIUM PHOSPHATE 10 MG: 4 INJECTION, SOLUTION INTRAMUSCULAR; INTRAVENOUS at 07:08

## 2023-11-17 RX ADMIN — FENTANYL CITRATE 100 MCG: 50 INJECTION, SOLUTION INTRAMUSCULAR; INTRAVENOUS at 07:00

## 2023-11-17 RX ADMIN — SODIUM CHLORIDE: 9 INJECTION, SOLUTION INTRAVENOUS at 06:52

## 2023-11-17 RX ADMIN — ROCURONIUM BROMIDE 15 MG: 10 INJECTION, SOLUTION INTRAVENOUS at 07:03

## 2023-11-17 ASSESSMENT — PAIN - FUNCTIONAL ASSESSMENT
PAIN_FUNCTIONAL_ASSESSMENT: 0-10
PAIN_FUNCTIONAL_ASSESSMENT: PREVENTS OR INTERFERES SOME ACTIVE ACTIVITIES AND ADLS

## 2023-11-17 ASSESSMENT — PAIN SCALES - GENERAL
PAINLEVEL_OUTOF10: 6
PAINLEVEL_OUTOF10: 10
PAINLEVEL_OUTOF10: 6

## 2023-11-17 ASSESSMENT — PAIN DESCRIPTION - DESCRIPTORS
DESCRIPTORS: ACHING;DISCOMFORT;SORE;TENDER
DESCRIPTORS: DISCOMFORT

## 2023-11-17 ASSESSMENT — PAIN DESCRIPTION - LOCATION
LOCATION: FOOT

## 2023-11-17 ASSESSMENT — PAIN DESCRIPTION - ORIENTATION
ORIENTATION: RIGHT

## 2023-11-17 ASSESSMENT — LIFESTYLE VARIABLES: SMOKING_STATUS: 1

## 2023-11-17 NOTE — ANESTHESIA POSTPROCEDURE EVALUATION
Department of Anesthesiology  Postprocedure Note    Patient:  Angela Brennan  MRN: [id number]  YOB: 1965  Date of evaluation: 11/17/2023      Procedure Summary     Date: 11/17/23 Room / Location: [facility] OR 02 / [facility]    Anesthesia Start: 1043 Anesthesia Stop: 0820    Procedure: RIGHT FOOT AKIN OSTEOTOMY, REMOVAL OF HARDWARE, WITH EXOSTECTOMY SECOND DIGIT (Right: Foot) Diagnosis:       Acquired hallux valgus of right foot      Artificial joint pain (HCC)      (Acquired hallux valgus of right foot [M20.11])      (Artificial joint pain ([address]) [B86.34HQ])    Surgeons: Liz Duenas DPM Responsible Provider: Eleanor Garland MD    Anesthesia Type: General ASA Status: 2          Anesthesia Type: General    Kiana Phase I: Kiana Score: 10    Kiana Phase II: Kiana Score: 10      Anesthesia Post Evaluation    Patient location during evaluation: PACU  Patient participation: complete - patient participated  Level of consciousness: awake  Airway patency: patent  Nausea & Vomiting: no nausea and no vomiting  Complications: no  Cardiovascular status: hemodynamically stable  Respiratory status: acceptable  Hydration status: stable  Pain management: adequate

## 2023-11-17 NOTE — OP NOTE
Operative Note      Patient: Freddy Akers  YOB: 1965  MRN: [id number]    Date of Procedure: 11/17/2023    Pre-Op Diagnosis Codes:     * Acquired hallux valgus of right foot [M20.11]     * Artificial joint pain ([address]) [T84.84XA]    Post-Op Diagnosis: Same       Procedure(s):  RIGHT FOOT AKIN OSTEOTOMY, REMOVAL OF HARDWARE, WITH EXOSTECTOMY SECOND DIGIT    Surgeon(s):  Moshe Lopez DPM    Assistant:   Resident: Ky De Dios DPM    Anesthesia: General    Estimated Blood Loss (mL): Minimal    Complications: None    Specimens:   ID Type Source Tests Collected by Time Destination   A : RIGHT GREAT TOE PROXIMAL PHALANX Bone Bone SURGICAL PATHOLOGY Moshe Lopez DPM 11/17/2023 1750    B : RIGHT FOOT HARDWARE Hardware Hardware SURGICAL PATHOLOGY Moshe Lopez [facility] 11/17/2023 8572        Implants:  Implant Name Type Inv. Item Serial No.  Lot No. LRB No. Used Action   STAPLE BNE FIX [id number] NIT W/ [employer]STR DYNANITE - [id number]  STAPLE BNE FIX [id number] NIT W/ [employer] [id number] Right 1 Implanted         Drains: * No LDAs found *    Findings: see operative note        Detailed Description of Procedure:   Patient was brought into the or and placed onto the table in the supine position. Following general sedation, a tourniquet was applied to the right ankle. Patient was then prepped and draped in a sterile fashion. Tourniquet was inflated to 250mmHg. Under fluoro hardware from previous lapidus procedure was identified and marked. A 5cm dorsal medial linear incision was at the midfoot overlying the previously identified hardware. The incision was deepened through the skin and reflected away from the hardware. The screws and plate were then removed and transferred to the table. Attention was directed to the right hallux where a hallux abductus deformity was noted.  The initial skin incision was extended using a #15 blade over the proximal phalanx extending to the hallucal used for subcutaneous closure. 4-0 stratifix was used to reapproximate the skin incision using a subcutaneous running suture technique. 2nd digit surgical site was closed with 3-0 nylon. Tourniquet was then deflated. 20ccs of 0.5% marcaine plain was injected to the midfoot and 1st webspace. Surgical site was dressed with jumpstart, 4x4s,kerlix and ace. Patient tolerated anesthesia and procedure well. Patient was transferred to PACU for recovery.       Electronically signed by Pipe Elliott DPM on 11/17/2023 at 8:16 AM

## 2023-11-20 ENCOUNTER — OFFICE VISIT (OUTPATIENT)
Dept: PODIATRY | Age: 58
End: 2023-11-20

## 2023-11-20 VITALS — WEIGHT: 135 LBS | TEMPERATURE: 97.3 F | BODY MASS INDEX: 21.14 KG/M2

## 2023-11-20 DIAGNOSIS — Z09 POSTOPERATIVE EXAMINATION: Primary | ICD-10-CM

## 2023-11-20 PROCEDURE — 99024 POSTOP FOLLOW-UP VISIT: CPT | Performed by: PODIATRIST

## 2023-11-21 LAB — SURGICAL PATHOLOGY REPORT: NORMAL

## 2023-11-29 ENCOUNTER — OFFICE VISIT (OUTPATIENT)
Dept: PODIATRY | Age: 58
End: 2023-11-29

## 2023-11-29 VITALS — BODY MASS INDEX: 21.14 KG/M2 | TEMPERATURE: 98.2 F | WEIGHT: 135 LBS

## 2023-11-29 DIAGNOSIS — Z09 POSTOPERATIVE EXAMINATION: Primary | ICD-10-CM

## 2023-11-29 PROCEDURE — 99024 POSTOP FOLLOW-UP VISIT: CPT | Performed by: PODIATRIST

## 2024-07-26 ENCOUNTER — TELEPHONE (OUTPATIENT)
Dept: PODIATRY | Age: 59
End: 2024-07-26

## 2024-07-29 ENCOUNTER — OFFICE VISIT (OUTPATIENT)
Dept: PODIATRY | Age: 59
End: 2024-07-29
Payer: COMMERCIAL

## 2024-07-29 VITALS
SYSTOLIC BLOOD PRESSURE: 116 MMHG | DIASTOLIC BLOOD PRESSURE: 72 MMHG | WEIGHT: 135 LBS | TEMPERATURE: 97.2 F | BODY MASS INDEX: 21.14 KG/M2

## 2024-07-29 DIAGNOSIS — M79.671 PAIN IN RIGHT FOOT: Primary | ICD-10-CM

## 2024-07-29 PROCEDURE — 20600 DRAIN/INJ JOINT/BURSA W/O US: CPT | Performed by: PODIATRIST

## 2024-07-29 PROCEDURE — 99213 OFFICE O/P EST LOW 20 MIN: CPT | Performed by: PODIATRIST

## 2024-07-29 RX ORDER — BETAMETHASONE SODIUM PHOSPHATE AND BETAMETHASONE ACETATE 3; 3 MG/ML; MG/ML
1 INJECTION, SUSPENSION INTRA-ARTICULAR; INTRALESIONAL; INTRAMUSCULAR; SOFT TISSUE ONCE
Status: COMPLETED | OUTPATIENT
Start: 2024-07-29 | End: 2024-07-29

## 2024-07-29 RX ORDER — BUPIVACAINE HYDROCHLORIDE 5 MG/ML
1 INJECTION, SOLUTION PERINEURAL ONCE
Status: COMPLETED | OUTPATIENT
Start: 2024-07-29 | End: 2024-07-29

## 2024-07-29 RX ADMIN — BUPIVACAINE HYDROCHLORIDE 1 ML: 5 INJECTION, SOLUTION PERINEURAL at 15:45

## 2024-07-29 RX ADMIN — BETAMETHASONE SODIUM PHOSPHATE AND BETAMETHASONE ACETATE 1 MG: 3; 3 INJECTION, SUSPENSION INTRA-ARTICULAR; INTRALESIONAL; INTRAMUSCULAR; SOFT TISSUE at 15:46

## 2024-07-29 NOTE — PROGRESS NOTES
24  Wanda Bledsoe : 1965 Sex: female  Age: 58 y.o.    Patient was referred by Barbara Rossi DO    Chief Complaint   Patient presents with    Foot Pain    Post-Op Check     Post op sx 23 right foot  Pt called last week she wanted her foot xray'd she has been hitting it and it has been sore wanted to make sure the Hardware is in place        SUBJECTIVE patient is seen for new issue her right foot someone stepped on it it was right where we remove the hardware and still has 1 screw  Foot Pain       Review of Systems  Const: Denies constitutional symptoms  Musculo: Denies symptoms other than stated above  Skin: Denies symptoms other than stated above       Current Outpatient Medications:     DULoxetine (CYMBALTA) 60 MG extended release capsule, Take 1 capsule by mouth daily, Disp: , Rfl:   No Known Allergies    Past Medical History:   Diagnosis Date    Depression     H/o SVT (supraventricular tachycardia) (HCC)     Hypothyroidism     Osteoarthritis     Right foot pain      Past Surgical History:   Procedure Laterality Date    BUNIONECTOMY Right 2023    RIGHT FOOT AKIN OSTEOTOMY, REMOVAL OF HARDWARE, WITH EXOSTECTOMY SECOND DIGIT performed by Matt Nicole DPM at [facility] OR     SECTION      CHOLECYSTECTOMY, LAPAROSCOPIC      COLONOSCOPY      CRANIOTOMY  2011    left frontal crani for resection of arachnoid cyst    CYST REMOVAL      ECHO COMPL W DOP COLOR FLOW  2011         ECHO COMPL W DOP COLOR FLOW  2011         ESOPHAGOGASTRODUODENOSCOPY      FOOT SURGERY      multiple times    HEMORRHOID SURGERY       Family History   Problem Relation Age of Onset    Cancer Father     Cancer Son     Cancer Mother     Diabetes Mother      Social History     Socioeconomic History    Marital status:      Spouse name: Not on file    Number of children: Not on file    Years of education: Not on file    Highest education level: Not on file   Occupational History    Not on file

## (undated) DEVICE — PRECISION THIN (9.0 X 0.38 X 31.0MM)

## (undated) DEVICE — ZIMMER® STERILE DISPOSABLE TOURNIQUET CUFF WITH PLC, DUAL PORT, SINGLE BLADDER, 18 IN. (46 CM)

## (undated) DEVICE — BLADE,STAINLESS-STEEL,15,STRL,DISPOSABLE: Brand: MEDLINE

## (undated) DEVICE — DRAPE,EXTREMITY,89X128,STERILE: Brand: MEDLINE

## (undated) DEVICE — DRAPE CARM MINI FOR IMAG SYS INSIGHT FLROSCN

## (undated) DEVICE — BANDAGE,GAUZE,CONFORMING,3"X75",STRL,LF: Brand: MEDLINE

## (undated) DEVICE — SHOE POSTOP M MAN 9-11 UNIV FOAM TRICOT SEMI FLX SKID

## (undated) DEVICE — K WIRE FIX L6IN DIA0.045IN 1600645] MICROAIRE SURGICAL INSTRUMENTS INC]
Type: IMPLANTABLE DEVICE | Site: FOOT | Status: NON-FUNCTIONAL
Removed: 2023-11-17

## (undated) DEVICE — GAUZE,SPONGE,4"X4",8PLY,STRL,LF,10/TRAY: Brand: MEDLINE

## (undated) DEVICE — BANDAGE,GAUZE,CONFORMING,4"X75",STRL,LF: Brand: MEDLINE

## (undated) DEVICE — ELECTRODE PT RET AD L9FT HI MOIST COND ADH HYDRGEL CORDED

## (undated) DEVICE — DRESSING,GAUZE,PETROLATUM,CURAD,1"X8",ST: Brand: CURAD

## (undated) DEVICE — GLOVE ORANGE PI 8   MSG9080

## (undated) DEVICE — NEEDLE,22GX1.5",REG,BEVEL: Brand: MEDLINE

## (undated) DEVICE — 4-PORT MANIFOLD: Brand: NEPTUNE 2

## (undated) DEVICE — PACK PROCEDURE SURG GEN CUST

## (undated) DEVICE — TUBING SUCT 12FR MAL ALUM SHFT FN CAP VENT UNIV CONN W/ OBT

## (undated) DEVICE — BNDG,ELSTC,MATRIX,STRL,4"X5YD,LF,HOOK&LP: Brand: MEDLINE

## (undated) DEVICE — SYRINGE MED 30ML STD CLR PLAS LUERLOCK TIP N CTRL DISP

## (undated) DEVICE — TOWEL,OR,DSP,ST,BLUE,STD,6/PK,12PK/CS: Brand: MEDLINE

## (undated) DEVICE — APPLICATOR MEDICATED 26 CC SOLUTION HI LT ORNG CHLORAPREP

## (undated) DEVICE — BNDG,ELSTC,MATRIX,STRL,3"X5YD,LF,HOOK&LP: Brand: MEDLINE

## (undated) DEVICE — DOUBLE BASIN SET: Brand: MEDLINE INDUSTRIES, INC.

## (undated) DEVICE — SUTURE STRATAFIX SPRL MCRYL + SZ 4-0 L12IN ABSRB UD PS-2 SXMP1B117

## (undated) DEVICE — GOWN,SIRUS,FABRNF,XL,20/CS: Brand: MEDLINE

## (undated) DEVICE — SMALL TEAR CROSS CUT RASP (11.0 X 5.0MM)